# Patient Record
Sex: MALE | Race: WHITE | NOT HISPANIC OR LATINO | URBAN - METROPOLITAN AREA
[De-identification: names, ages, dates, MRNs, and addresses within clinical notes are randomized per-mention and may not be internally consistent; named-entity substitution may affect disease eponyms.]

---

## 2018-05-08 ENCOUNTER — EMERGENCY (EMERGENCY)
Facility: HOSPITAL | Age: 73
LOS: 0 days | Discharge: NURSING, REHAB OR LONG-TERM CARE | End: 2018-05-08
Attending: EMERGENCY MEDICINE | Admitting: EMERGENCY MEDICINE
Payer: MEDICARE

## 2018-05-08 VITALS
HEART RATE: 72 BPM | RESPIRATION RATE: 14 BRPM | DIASTOLIC BLOOD PRESSURE: 64 MMHG | SYSTOLIC BLOOD PRESSURE: 135 MMHG | OXYGEN SATURATION: 97 % | TEMPERATURE: 99 F

## 2018-05-08 VITALS
HEIGHT: 71 IN | WEIGHT: 197.09 LBS | HEART RATE: 79 BPM | RESPIRATION RATE: 14 BRPM | OXYGEN SATURATION: 97 % | TEMPERATURE: 99 F | SYSTOLIC BLOOD PRESSURE: 85 MMHG | DIASTOLIC BLOOD PRESSURE: 58 MMHG

## 2018-05-08 LAB
ALBUMIN SERPL ELPH-MCNC: 2.7 G/DL — LOW (ref 3.3–5)
ALP SERPL-CCNC: 69 U/L — SIGNIFICANT CHANGE UP (ref 40–120)
ALT FLD-CCNC: 43 U/L — SIGNIFICANT CHANGE UP (ref 12–78)
ANION GAP SERPL CALC-SCNC: 7 MMOL/L — SIGNIFICANT CHANGE UP (ref 5–17)
AST SERPL-CCNC: 35 U/L — SIGNIFICANT CHANGE UP (ref 15–37)
BASOPHILS # BLD AUTO: 0.04 K/UL — SIGNIFICANT CHANGE UP (ref 0–0.2)
BASOPHILS NFR BLD AUTO: 0.3 % — SIGNIFICANT CHANGE UP (ref 0–2)
BILIRUB SERPL-MCNC: 0.2 MG/DL — SIGNIFICANT CHANGE UP (ref 0.2–1.2)
BUN SERPL-MCNC: 21 MG/DL — SIGNIFICANT CHANGE UP (ref 7–23)
CALCIUM SERPL-MCNC: 9.1 MG/DL — SIGNIFICANT CHANGE UP (ref 8.5–10.1)
CHLORIDE SERPL-SCNC: 101 MMOL/L — SIGNIFICANT CHANGE UP (ref 96–108)
CO2 SERPL-SCNC: 28 MMOL/L — SIGNIFICANT CHANGE UP (ref 22–31)
CREAT SERPL-MCNC: 0.76 MG/DL — SIGNIFICANT CHANGE UP (ref 0.5–1.3)
EOSINOPHIL # BLD AUTO: 0.12 K/UL — SIGNIFICANT CHANGE UP (ref 0–0.5)
EOSINOPHIL NFR BLD AUTO: 1 % — SIGNIFICANT CHANGE UP (ref 0–6)
GLUCOSE SERPL-MCNC: 99 MG/DL — SIGNIFICANT CHANGE UP (ref 70–99)
HCT VFR BLD CALC: 33.3 % — LOW (ref 39–50)
HGB BLD-MCNC: 10.8 G/DL — LOW (ref 13–17)
IMM GRANULOCYTES NFR BLD AUTO: 0.4 % — SIGNIFICANT CHANGE UP (ref 0–1.5)
LACTATE SERPL-SCNC: 2.1 MMOL/L — HIGH (ref 0.7–2)
LYMPHOCYTES # BLD AUTO: 1.61 K/UL — SIGNIFICANT CHANGE UP (ref 1–3.3)
LYMPHOCYTES # BLD AUTO: 13.8 % — SIGNIFICANT CHANGE UP (ref 13–44)
MCHC RBC-ENTMCNC: 27.3 PG — SIGNIFICANT CHANGE UP (ref 27–34)
MCHC RBC-ENTMCNC: 32.4 GM/DL — SIGNIFICANT CHANGE UP (ref 32–36)
MCV RBC AUTO: 84.1 FL — SIGNIFICANT CHANGE UP (ref 80–100)
MONOCYTES # BLD AUTO: 0.98 K/UL — HIGH (ref 0–0.9)
MONOCYTES NFR BLD AUTO: 8.4 % — SIGNIFICANT CHANGE UP (ref 2–14)
NEUTROPHILS # BLD AUTO: 8.87 K/UL — HIGH (ref 1.8–7.4)
NEUTROPHILS NFR BLD AUTO: 76.1 % — SIGNIFICANT CHANGE UP (ref 43–77)
PLATELET # BLD AUTO: 399 K/UL — SIGNIFICANT CHANGE UP (ref 150–400)
POTASSIUM SERPL-MCNC: 4.3 MMOL/L — SIGNIFICANT CHANGE UP (ref 3.5–5.3)
POTASSIUM SERPL-SCNC: 4.3 MMOL/L — SIGNIFICANT CHANGE UP (ref 3.5–5.3)
PROT SERPL-MCNC: 6.8 GM/DL — SIGNIFICANT CHANGE UP (ref 6–8.3)
RBC # BLD: 3.96 M/UL — LOW (ref 4.2–5.8)
RBC # FLD: 14.6 % — HIGH (ref 10.3–14.5)
SODIUM SERPL-SCNC: 136 MMOL/L — SIGNIFICANT CHANGE UP (ref 135–145)
WBC # BLD: 11.67 K/UL — HIGH (ref 3.8–10.5)
WBC # FLD AUTO: 11.67 K/UL — HIGH (ref 3.8–10.5)

## 2018-05-08 PROCEDURE — 99284 EMERGENCY DEPT VISIT MOD MDM: CPT

## 2018-05-08 PROCEDURE — 73630 X-RAY EXAM OF FOOT: CPT | Mod: 26,LT

## 2018-05-08 PROCEDURE — 93010 ELECTROCARDIOGRAM REPORT: CPT

## 2018-05-08 RX ORDER — SODIUM CHLORIDE 9 MG/ML
2700 INJECTION INTRAMUSCULAR; INTRAVENOUS; SUBCUTANEOUS ONCE
Qty: 0 | Refills: 0 | Status: DISCONTINUED | OUTPATIENT
Start: 2018-05-08 | End: 2018-05-08

## 2018-05-08 RX ORDER — SODIUM CHLORIDE 9 MG/ML
1000 INJECTION INTRAMUSCULAR; INTRAVENOUS; SUBCUTANEOUS ONCE
Qty: 0 | Refills: 0 | Status: COMPLETED | OUTPATIENT
Start: 2018-05-08 | End: 2018-05-08

## 2018-05-08 RX ADMIN — SODIUM CHLORIDE 2000 MILLILITER(S): 9 INJECTION INTRAMUSCULAR; INTRAVENOUS; SUBCUTANEOUS at 16:57

## 2018-05-08 NOTE — ED PROVIDER NOTE - OBJECTIVE STATEMENT
74 y/o M w/ hx CVA, PVD pw L heel ulcer, concern for osteo.  Chronic L heel ulcer concern by PMD for osteo, to f/u w/ Dr. Pugh.  Per transfer note, fever, afebrile in ED, pt denies fever, CP, cough, AP, n/v, d/c.

## 2018-05-08 NOTE — ED PROVIDER NOTE - CHIEF COMPLAINT
The patient is a 73y Male complaining of toe pain. The patient is a 73y Male complaining of L heel wound.

## 2018-05-08 NOTE — ED PROVIDER NOTE - PHYSICAL EXAMINATION
wound to dorsal L foot 10x10 w/o surrounding erythema  5x5cm wound to L heel w/o surrounding erythema wound to dorsal L foot 10x10 w/o surrounding erythema  14n30al wound to L heel w/o surrounding erythema  5x5 cm wound to L lateral foot w/o surrounding erythema

## 2018-05-08 NOTE — ED ADULT TRIAGE NOTE - CHIEF COMPLAINT QUOTE
osteo of the toe, sent to see dr mendoza osteo of the toe, sent to see dr mendoza. code sepsis initiated.

## 2018-05-08 NOTE — ED PROVIDER NOTE - PROGRESS NOTE DETAILS
Scribe Jennifer Lopez: skin ulceration to dorsal L foot 10x10 w/o surrounding erythema  5x5cm skin ulceration to L heel w/o surrounding erythema erin Pugh unware of pt, erin Manuel unaware of why pt transported to ED, attempted to reach [facility] care w/o success.  Dw son states was transported to see vascular for possible infection.  wound appears chronic in nature w/o acute infection, will transport back to NH.

## 2018-05-09 DIAGNOSIS — I10 ESSENTIAL (PRIMARY) HYPERTENSION: ICD-10-CM

## 2018-05-09 DIAGNOSIS — G40.909 EPILEPSY, UNSPECIFIED, NOT INTRACTABLE, WITHOUT STATUS EPILEPTICUS: ICD-10-CM

## 2018-05-09 DIAGNOSIS — L89.629 PRESSURE ULCER OF LEFT HEEL, UNSPECIFIED STAGE: ICD-10-CM

## 2018-05-09 DIAGNOSIS — E03.9 HYPOTHYROIDISM, UNSPECIFIED: ICD-10-CM

## 2018-05-09 DIAGNOSIS — I69.359 HEMIPLEGIA AND HEMIPARESIS FOLLOWING CEREBRAL INFARCTION AFFECTING UNSPECIFIED SIDE: ICD-10-CM

## 2018-05-09 DIAGNOSIS — E78.5 HYPERLIPIDEMIA, UNSPECIFIED: ICD-10-CM

## 2018-05-13 LAB
CULTURE RESULTS: SIGNIFICANT CHANGE UP
CULTURE RESULTS: SIGNIFICANT CHANGE UP
SPECIMEN SOURCE: SIGNIFICANT CHANGE UP
SPECIMEN SOURCE: SIGNIFICANT CHANGE UP

## 2018-05-14 PROBLEM — I10 ESSENTIAL (PRIMARY) HYPERTENSION: Chronic | Status: ACTIVE | Noted: 2018-05-08

## 2018-05-14 PROBLEM — E78.5 HYPERLIPIDEMIA, UNSPECIFIED: Chronic | Status: ACTIVE | Noted: 2018-05-08

## 2018-05-14 PROBLEM — G40.909 EPILEPSY, UNSPECIFIED, NOT INTRACTABLE, WITHOUT STATUS EPILEPTICUS: Chronic | Status: ACTIVE | Noted: 2018-05-08

## 2018-05-14 PROBLEM — E03.9 HYPOTHYROIDISM, UNSPECIFIED: Chronic | Status: ACTIVE | Noted: 2018-05-08

## 2018-05-14 PROBLEM — I63.9 CEREBRAL INFARCTION, UNSPECIFIED: Chronic | Status: ACTIVE | Noted: 2018-05-08

## 2018-05-14 PROBLEM — G81.90 HEMIPLEGIA, UNSPECIFIED AFFECTING UNSPECIFIED SIDE: Chronic | Status: ACTIVE | Noted: 2018-05-08

## 2018-05-24 ENCOUNTER — RESULT REVIEW (OUTPATIENT)
Age: 73
End: 2018-05-24

## 2018-05-24 ENCOUNTER — INPATIENT (INPATIENT)
Facility: HOSPITAL | Age: 73
LOS: 4 days | Discharge: NURSING, REHAB OR LONG-TERM CARE | End: 2018-05-29
Attending: THORACIC SURGERY (CARDIOTHORACIC VASCULAR SURGERY) | Admitting: THORACIC SURGERY (CARDIOTHORACIC VASCULAR SURGERY)
Payer: MEDICARE

## 2018-05-24 VITALS
OXYGEN SATURATION: 98 % | TEMPERATURE: 98 F | HEART RATE: 80 BPM | SYSTOLIC BLOOD PRESSURE: 142 MMHG | RESPIRATION RATE: 16 BRPM | WEIGHT: 188.5 LBS | DIASTOLIC BLOOD PRESSURE: 64 MMHG

## 2018-05-24 DIAGNOSIS — I65.29 OCCLUSION AND STENOSIS OF UNSPECIFIED CAROTID ARTERY: Chronic | ICD-10-CM

## 2018-05-24 LAB
ABO RH CONFIRMATION: SIGNIFICANT CHANGE UP
BLD GP AB SCN SERPL QL: SIGNIFICANT CHANGE UP
TYPE + AB SCN PNL BLD: SIGNIFICANT CHANGE UP

## 2018-05-24 PROCEDURE — 88307 TISSUE EXAM BY PATHOLOGIST: CPT | Mod: 26

## 2018-05-24 RX ORDER — DOCUSATE SODIUM 100 MG
200 CAPSULE ORAL AT BEDTIME
Qty: 0 | Refills: 0 | Status: DISCONTINUED | OUTPATIENT
Start: 2018-05-24 | End: 2018-05-29

## 2018-05-24 RX ORDER — PREGABALIN 225 MG/1
1000 CAPSULE ORAL DAILY
Qty: 0 | Refills: 0 | Status: DISCONTINUED | OUTPATIENT
Start: 2018-05-24 | End: 2018-05-29

## 2018-05-24 RX ORDER — IBUPROFEN 200 MG
400 TABLET ORAL
Qty: 0 | Refills: 0 | Status: DISCONTINUED | OUTPATIENT
Start: 2018-05-24 | End: 2018-05-29

## 2018-05-24 RX ORDER — FERROUS SULFATE 325(65) MG
1 TABLET ORAL
Qty: 0 | Refills: 0 | COMMUNITY

## 2018-05-24 RX ORDER — SERTRALINE 25 MG/1
1 TABLET, FILM COATED ORAL
Qty: 0 | Refills: 0 | COMMUNITY

## 2018-05-24 RX ORDER — ALPRAZOLAM 0.25 MG
0.25 TABLET ORAL AT BEDTIME
Qty: 0 | Refills: 0 | Status: DISCONTINUED | OUTPATIENT
Start: 2018-05-24 | End: 2018-05-29

## 2018-05-24 RX ORDER — DOCUSATE SODIUM 100 MG
2 CAPSULE ORAL
Qty: 0 | Refills: 0 | COMMUNITY

## 2018-05-24 RX ORDER — CEFTRIAXONE 500 MG/1
1 INJECTION, POWDER, FOR SOLUTION INTRAMUSCULAR; INTRAVENOUS
Qty: 0 | Refills: 0 | COMMUNITY

## 2018-05-24 RX ORDER — SERTRALINE 25 MG/1
50 TABLET, FILM COATED ORAL DAILY
Qty: 0 | Refills: 0 | Status: DISCONTINUED | OUTPATIENT
Start: 2018-05-24 | End: 2018-05-29

## 2018-05-24 RX ORDER — HYDROMORPHONE HYDROCHLORIDE 2 MG/ML
0.5 INJECTION INTRAMUSCULAR; INTRAVENOUS; SUBCUTANEOUS
Qty: 0 | Refills: 0 | Status: DISCONTINUED | OUTPATIENT
Start: 2018-05-24 | End: 2018-05-24

## 2018-05-24 RX ORDER — CEFAZOLIN SODIUM 1 G
2000 VIAL (EA) INJECTION EVERY 8 HOURS
Qty: 0 | Refills: 0 | Status: COMPLETED | OUTPATIENT
Start: 2018-05-24 | End: 2018-05-25

## 2018-05-24 RX ORDER — LANOLIN ALCOHOL/MO/W.PET/CERES
1 CREAM (GRAM) TOPICAL
Qty: 0 | Refills: 0 | COMMUNITY

## 2018-05-24 RX ORDER — LEVETIRACETAM 250 MG/1
1 TABLET, FILM COATED ORAL
Qty: 0 | Refills: 0 | COMMUNITY

## 2018-05-24 RX ORDER — CLOPIDOGREL BISULFATE 75 MG/1
1 TABLET, FILM COATED ORAL
Qty: 0 | Refills: 0 | COMMUNITY

## 2018-05-24 RX ORDER — LACTOBACILLUS ACIDOPHILUS 100MM CELL
1 CAPSULE ORAL
Qty: 0 | Refills: 0 | COMMUNITY

## 2018-05-24 RX ORDER — FOLIC ACID 0.8 MG
1 TABLET ORAL DAILY
Qty: 0 | Refills: 0 | Status: DISCONTINUED | OUTPATIENT
Start: 2018-05-24 | End: 2018-05-29

## 2018-05-24 RX ORDER — ASCORBIC ACID 60 MG
1 TABLET,CHEWABLE ORAL
Qty: 0 | Refills: 0 | COMMUNITY

## 2018-05-24 RX ORDER — EZETIMIBE 10 MG/1
1 TABLET ORAL
Qty: 0 | Refills: 0 | COMMUNITY

## 2018-05-24 RX ORDER — METOPROLOL TARTRATE 50 MG
25 TABLET ORAL
Qty: 0 | Refills: 0 | Status: DISCONTINUED | OUTPATIENT
Start: 2018-05-24 | End: 2018-05-29

## 2018-05-24 RX ORDER — ASCORBIC ACID 60 MG
500 TABLET,CHEWABLE ORAL DAILY
Qty: 0 | Refills: 0 | Status: DISCONTINUED | OUTPATIENT
Start: 2018-05-24 | End: 2018-05-29

## 2018-05-24 RX ORDER — LEVETIRACETAM 250 MG/1
500 TABLET, FILM COATED ORAL
Qty: 0 | Refills: 0 | Status: DISCONTINUED | OUTPATIENT
Start: 2018-05-24 | End: 2018-05-29

## 2018-05-24 RX ORDER — CLOPIDOGREL BISULFATE 75 MG/1
75 TABLET, FILM COATED ORAL DAILY
Qty: 0 | Refills: 0 | Status: DISCONTINUED | OUTPATIENT
Start: 2018-05-24 | End: 2018-05-29

## 2018-05-24 RX ORDER — PREGABALIN 225 MG/1
1 CAPSULE ORAL
Qty: 0 | Refills: 0 | COMMUNITY

## 2018-05-24 RX ORDER — SIMVASTATIN 20 MG/1
1 TABLET, FILM COATED ORAL
Qty: 0 | Refills: 0 | COMMUNITY

## 2018-05-24 RX ORDER — CHOLECALCIFEROL (VITAMIN D3) 125 MCG
1 CAPSULE ORAL
Qty: 0 | Refills: 0 | COMMUNITY

## 2018-05-24 RX ORDER — METOPROLOL TARTRATE 50 MG
1 TABLET ORAL
Qty: 0 | Refills: 0 | COMMUNITY

## 2018-05-24 RX ORDER — MORPHINE SULFATE 50 MG/1
5 CAPSULE, EXTENDED RELEASE ORAL
Qty: 0 | Refills: 0 | Status: DISCONTINUED | OUTPATIENT
Start: 2018-05-24 | End: 2018-05-29

## 2018-05-24 RX ORDER — OXYCODONE HYDROCHLORIDE 5 MG/1
5 TABLET ORAL
Qty: 0 | Refills: 0 | Status: DISCONTINUED | OUTPATIENT
Start: 2018-05-24 | End: 2018-05-29

## 2018-05-24 RX ORDER — ENOXAPARIN SODIUM 100 MG/ML
40 INJECTION SUBCUTANEOUS DAILY
Qty: 0 | Refills: 0 | Status: DISCONTINUED | OUTPATIENT
Start: 2018-05-24 | End: 2018-05-29

## 2018-05-24 RX ORDER — LACTOBACILLUS ACIDOPHILUS 100MM CELL
1 CAPSULE ORAL DAILY
Qty: 0 | Refills: 0 | Status: DISCONTINUED | OUTPATIENT
Start: 2018-05-24 | End: 2018-05-29

## 2018-05-24 RX ORDER — DOCUSATE SODIUM 100 MG
200 CAPSULE ORAL AT BEDTIME
Qty: 0 | Refills: 0 | Status: DISCONTINUED | OUTPATIENT
Start: 2018-05-24 | End: 2018-05-24

## 2018-05-24 RX ORDER — SODIUM CHLORIDE 9 MG/ML
1000 INJECTION INTRAMUSCULAR; INTRAVENOUS; SUBCUTANEOUS
Qty: 0 | Refills: 0 | Status: DISCONTINUED | OUTPATIENT
Start: 2018-05-24 | End: 2018-05-25

## 2018-05-24 RX ORDER — IBUPROFEN 200 MG
1 TABLET ORAL
Qty: 0 | Refills: 0 | COMMUNITY

## 2018-05-24 RX ORDER — ALPRAZOLAM 0.25 MG
1 TABLET ORAL
Qty: 0 | Refills: 0 | COMMUNITY

## 2018-05-24 RX ORDER — SIMVASTATIN 20 MG/1
10 TABLET, FILM COATED ORAL AT BEDTIME
Qty: 0 | Refills: 0 | Status: DISCONTINUED | OUTPATIENT
Start: 2018-05-24 | End: 2018-05-29

## 2018-05-24 RX ORDER — OXYCODONE HYDROCHLORIDE 5 MG/1
1 TABLET ORAL
Qty: 0 | Refills: 0 | COMMUNITY

## 2018-05-24 RX ORDER — FOLIC ACID 0.8 MG
1 TABLET ORAL
Qty: 0 | Refills: 0 | COMMUNITY

## 2018-05-24 RX ORDER — SODIUM CHLORIDE 9 MG/ML
1000 INJECTION, SOLUTION INTRAVENOUS
Qty: 0 | Refills: 0 | Status: DISCONTINUED | OUTPATIENT
Start: 2018-05-24 | End: 2018-05-24

## 2018-05-24 RX ORDER — POLYETHYLENE GLYCOL 3350 17 G/17G
17 POWDER, FOR SOLUTION ORAL
Qty: 0 | Refills: 0 | COMMUNITY

## 2018-05-24 RX ORDER — POLYETHYLENE GLYCOL 3350 17 G/17G
17 POWDER, FOR SOLUTION ORAL DAILY
Qty: 0 | Refills: 0 | Status: DISCONTINUED | OUTPATIENT
Start: 2018-05-24 | End: 2018-05-29

## 2018-05-24 RX ORDER — LANOLIN ALCOHOL/MO/W.PET/CERES
3 CREAM (GRAM) TOPICAL AT BEDTIME
Qty: 0 | Refills: 0 | Status: DISCONTINUED | OUTPATIENT
Start: 2018-05-24 | End: 2018-05-29

## 2018-05-24 RX ORDER — ONDANSETRON 8 MG/1
4 TABLET, FILM COATED ORAL ONCE
Qty: 0 | Refills: 0 | Status: DISCONTINUED | OUTPATIENT
Start: 2018-05-24 | End: 2018-05-24

## 2018-05-24 RX ORDER — FERROUS SULFATE 325(65) MG
325 TABLET ORAL DAILY
Qty: 0 | Refills: 0 | Status: DISCONTINUED | OUTPATIENT
Start: 2018-05-24 | End: 2018-05-29

## 2018-05-24 RX ADMIN — Medication 400 MILLIGRAM(S): at 18:09

## 2018-05-24 RX ADMIN — LEVETIRACETAM 500 MILLIGRAM(S): 250 TABLET, FILM COATED ORAL at 18:09

## 2018-05-24 RX ADMIN — OXYCODONE HYDROCHLORIDE 5 MILLIGRAM(S): 5 TABLET ORAL at 22:52

## 2018-05-24 RX ADMIN — POLYETHYLENE GLYCOL 3350 17 GRAM(S): 17 POWDER, FOR SOLUTION ORAL at 13:06

## 2018-05-24 RX ADMIN — Medication 25 MILLIGRAM(S): at 18:09

## 2018-05-24 RX ADMIN — Medication 500 MILLIGRAM(S): at 13:06

## 2018-05-24 RX ADMIN — PREGABALIN 1000 MICROGRAM(S): 225 CAPSULE ORAL at 13:05

## 2018-05-24 RX ADMIN — Medication 1 TABLET(S): at 13:05

## 2018-05-24 RX ADMIN — Medication 1 MILLIGRAM(S): at 13:06

## 2018-05-24 RX ADMIN — CLOPIDOGREL BISULFATE 75 MILLIGRAM(S): 75 TABLET, FILM COATED ORAL at 13:05

## 2018-05-24 RX ADMIN — Medication 100 MILLIGRAM(S): at 17:25

## 2018-05-24 RX ADMIN — SODIUM CHLORIDE 70 MILLILITER(S): 9 INJECTION INTRAMUSCULAR; INTRAVENOUS; SUBCUTANEOUS at 17:24

## 2018-05-24 RX ADMIN — SERTRALINE 50 MILLIGRAM(S): 25 TABLET, FILM COATED ORAL at 18:08

## 2018-05-24 RX ADMIN — Medication 200 MILLIGRAM(S): at 21:50

## 2018-05-24 RX ADMIN — ENOXAPARIN SODIUM 40 MILLIGRAM(S): 100 INJECTION SUBCUTANEOUS at 13:05

## 2018-05-24 RX ADMIN — Medication 3 MILLIGRAM(S): at 21:50

## 2018-05-24 RX ADMIN — Medication 325 MILLIGRAM(S): at 13:05

## 2018-05-24 RX ADMIN — SIMVASTATIN 10 MILLIGRAM(S): 20 TABLET, FILM COATED ORAL at 22:52

## 2018-05-24 RX ADMIN — Medication 0.25 MILLIGRAM(S): at 21:50

## 2018-05-24 NOTE — BRIEF OPERATIVE NOTE - PROCEDURE
<<-----Click on this checkbox to enter Procedure Above knee amputation  05/24/2018  L  Active  QUANGRO

## 2018-05-24 NOTE — CONSULT NOTE ADULT - SUBJECTIVE AND OBJECTIVE BOX
HPI: 74 y/o male with htn, hl, chronic left foot ulcerations, OA, CVA with residual eft sided weakness s/p elective left aka.  Medicine consult requested for post op medical management.  5/24/18: Pt denies any cp, sob, n/v/f/c; no pain at stump      PAST MEDICAL & SURGICAL HISTORY:  Carotid artery plaque  Pressure ulcer: left heel  Hemiparesis: left side  DVT (deep venous thrombosis)  OA (osteoarthritis)  Vitamin D deficiency  Hypothyroid  Epilepsy  HLD (hyperlipidemia)  HTN (hypertension)  Hemiplegia  CVA (cerebral vascular accident)  Carotid artery plaque: &quot;surgery right side      FAMILY HISTORY:   non-contributory to the patient's current presentation        SOCIAL HISTORY:  former smoking hx, no alcohol, no drugs    REVIEW OF SYSTEMS:   All 10 systems reviewed in detailed and found to be negative with the exception of what has already been described above    MEDICATIONS  (STANDING):  ALPRAZolam 0.25 milliGRAM(s) Oral at bedtime  ascorbic acid 500 milliGRAM(s) Oral daily  ceFAZolin   IVPB 2000 milliGRAM(s) IV Intermittent every 8 hours  clopidogrel Tablet 75 milliGRAM(s) Oral daily  cyanocobalamin 1000 MICROGram(s) Oral daily  docusate sodium 200 milliGRAM(s) Oral at bedtime  enoxaparin Injectable 40 milliGRAM(s) SubCutaneous daily  ferrous    sulfate 325 milliGRAM(s) Oral daily  folic acid 1 milliGRAM(s) Oral daily  ibuprofen  Tablet 400 milliGRAM(s) Oral two times a day  lactobacillus acidophilus 1 Tablet(s) Oral daily  levETIRAcetam 500 milliGRAM(s) Oral two times a day  melatonin 3 milliGRAM(s) Oral at bedtime  metoprolol tartrate 25 milliGRAM(s) Oral two times a day  multivitamin 1 Tablet(s) Oral daily  polyethylene glycol 3350 17 Gram(s) Oral daily  sertraline 50 milliGRAM(s) Oral daily  simvastatin 10 milliGRAM(s) Oral at bedtime  sodium chloride 0.9%. 1000 milliLiter(s) (70 mL/Hr) IV Continuous <Continuous>    MEDICATIONS  (PRN):  morphine  - Injectable 5 milliGRAM(s) SubCutaneous every 3 hours PRN Severe Pain  oxyCODONE    IR 5 milliGRAM(s) Oral three times a day with meals PRN Moderate Pain (4 - 6)      Allergies    No Known Allergies    Intolerances          PHYSICAL EXAM:    Vital Signs Last 24 Hrs  T(C): 36.7 (24 May 2018 11:33), Max: 37.2 (24 May 2018 09:25)  T(F): 98 (24 May 2018 11:33), Max: 98.9 (24 May 2018 09:25)  HR: 87 (24 May 2018 11:33) (80 - 89)  BP: 118/623 (24 May 2018 11:33) (111/67 - 143/62)  BP(mean): --  RR: 17 (24 May 2018 11:33) (14 - 17)  SpO2: 100% (24 May 2018 11:33) (95% - 100%)    GEN: A and O, NAD,  mood stable  HEENT:   NC/AT, EOMI,     NECK:   supple    CV:  +S1, +S2, regular, no murmurs or rubs    RESP:   lungs clear to auscultation bilaterally, no wheezing, rales, rhonchi, good air entry bilaterally, DECREASED BS ALEE    GI:  abdomen soft, non-tender, non-distended, normal BS,  no abdominal masses, no palpable masses    RECTAL:  not examined    :  POS SALDAÑA    MSK:   normal muscle tone, no atrophy, no rigidity, no contractions    EXT:   LEFT STUMP DRESSING C/D/I    VASCULAR:  pulses equal and symmetric in the upper and lower extremities    NEURO:  AAOX3, no focal neurological deficits, follows all commands, able to move extremities spontaneously    SKIN:  no ulcers, lesions or rashes    LABS/IMAGING:    PRE OP LABS REVIEWED    EKG: NSR@64BPM

## 2018-05-24 NOTE — CONSULT NOTE ADULT - ASSESSMENT
74 Y/O MALE WITH THE ABOVE MED HX S/P LEFT AKA.    *POSTPROCEDURAL STATE -   POD# 0  MONITOR DRESSING  PAIN CONTROL  STUMP CHECK BY TERESA IN A FEW DAYS  SHAYNE SALDAÑA IN AM  *HTN - BP STABLE  CONT METOPROLOL  *HX OF CVA WITH LEFT RESIDUAL WEAKNESS - CONT PLAVIX AND STATIN  *HL - CONT STATIN  *LEUKOCYTOSIS - NOTED ON PRE OP LABS ? ETIOLOGY, ULCERS?  CONT TO MONITOR  MONITOR FOR FEVERS  *ANEMIA - ON PRE OP LABS -- LIKELY CHRONIC DISEASE  CHECK H/H IN AM  *DVT PROPHY - ON LOVENOX

## 2018-05-24 NOTE — PATIENT PROFILE ADULT. - PMH
CVA (cerebral vascular accident)    DVT (deep venous thrombosis)    Epilepsy    Hemiparesis  left side  Hemiplegia    HLD (hyperlipidemia)    HTN (hypertension)    Hypothyroid    OA (osteoarthritis)    Vitamin D deficiency Carotid artery plaque    CVA (cerebral vascular accident)    DVT (deep venous thrombosis)    Epilepsy    Hemiparesis  left side  Hemiplegia    HLD (hyperlipidemia)    HTN (hypertension)    Hypothyroid    OA (osteoarthritis)    Pressure ulcer  left heel  Vitamin D deficiency

## 2018-05-24 NOTE — PATIENT PROFILE ADULT. - FALL HARM RISK
bones(Osteoporosis,prev fx,steroid use,metastatic bone ca/coagulation(Bleeding disorder R/T clinical cond/anti-coags)/other

## 2018-05-25 LAB
ANION GAP SERPL CALC-SCNC: 5 MMOL/L — SIGNIFICANT CHANGE UP (ref 5–17)
BASOPHILS # BLD AUTO: 0.04 K/UL — SIGNIFICANT CHANGE UP (ref 0–0.2)
BASOPHILS NFR BLD AUTO: 0.4 % — SIGNIFICANT CHANGE UP (ref 0–2)
BUN SERPL-MCNC: 10 MG/DL — SIGNIFICANT CHANGE UP (ref 7–23)
CALCIUM SERPL-MCNC: 8.3 MG/DL — LOW (ref 8.5–10.1)
CHLORIDE SERPL-SCNC: 104 MMOL/L — SIGNIFICANT CHANGE UP (ref 96–108)
CO2 SERPL-SCNC: 25 MMOL/L — SIGNIFICANT CHANGE UP (ref 22–31)
CREAT SERPL-MCNC: 0.62 MG/DL — SIGNIFICANT CHANGE UP (ref 0.5–1.3)
EOSINOPHIL # BLD AUTO: 0.22 K/UL — SIGNIFICANT CHANGE UP (ref 0–0.5)
EOSINOPHIL NFR BLD AUTO: 2.2 % — SIGNIFICANT CHANGE UP (ref 0–6)
GLUCOSE SERPL-MCNC: 102 MG/DL — HIGH (ref 70–99)
HCT VFR BLD CALC: 26.9 % — LOW (ref 39–50)
HGB BLD-MCNC: 8.8 G/DL — LOW (ref 13–17)
IMM GRANULOCYTES NFR BLD AUTO: 0.5 % — SIGNIFICANT CHANGE UP (ref 0–1.5)
LYMPHOCYTES # BLD AUTO: 1.12 K/UL — SIGNIFICANT CHANGE UP (ref 1–3.3)
LYMPHOCYTES # BLD AUTO: 11 % — LOW (ref 13–44)
MCHC RBC-ENTMCNC: 26.2 PG — LOW (ref 27–34)
MCHC RBC-ENTMCNC: 32.7 GM/DL — SIGNIFICANT CHANGE UP (ref 32–36)
MCV RBC AUTO: 80.1 FL — SIGNIFICANT CHANGE UP (ref 80–100)
MONOCYTES # BLD AUTO: 0.73 K/UL — SIGNIFICANT CHANGE UP (ref 0–0.9)
MONOCYTES NFR BLD AUTO: 7.2 % — SIGNIFICANT CHANGE UP (ref 2–14)
NEUTROPHILS # BLD AUTO: 7.98 K/UL — HIGH (ref 1.8–7.4)
NEUTROPHILS NFR BLD AUTO: 78.7 % — HIGH (ref 43–77)
NRBC # BLD: 0 /100 WBCS — SIGNIFICANT CHANGE UP (ref 0–0)
PLATELET # BLD AUTO: 395 K/UL — SIGNIFICANT CHANGE UP (ref 150–400)
POTASSIUM SERPL-MCNC: 3.5 MMOL/L — SIGNIFICANT CHANGE UP (ref 3.5–5.3)
POTASSIUM SERPL-SCNC: 3.5 MMOL/L — SIGNIFICANT CHANGE UP (ref 3.5–5.3)
RBC # BLD: 3.36 M/UL — LOW (ref 4.2–5.8)
RBC # FLD: 15.4 % — HIGH (ref 10.3–14.5)
SODIUM SERPL-SCNC: 134 MMOL/L — LOW (ref 135–145)
WBC # BLD: 10.14 K/UL — SIGNIFICANT CHANGE UP (ref 3.8–10.5)
WBC # FLD AUTO: 10.14 K/UL — SIGNIFICANT CHANGE UP (ref 3.8–10.5)

## 2018-05-25 RX ADMIN — OXYCODONE HYDROCHLORIDE 5 MILLIGRAM(S): 5 TABLET ORAL at 21:43

## 2018-05-25 RX ADMIN — Medication 400 MILLIGRAM(S): at 05:50

## 2018-05-25 RX ADMIN — Medication 500 MILLIGRAM(S): at 12:32

## 2018-05-25 RX ADMIN — POLYETHYLENE GLYCOL 3350 17 GRAM(S): 17 POWDER, FOR SOLUTION ORAL at 12:32

## 2018-05-25 RX ADMIN — Medication 400 MILLIGRAM(S): at 18:00

## 2018-05-25 RX ADMIN — Medication 25 MILLIGRAM(S): at 05:50

## 2018-05-25 RX ADMIN — ENOXAPARIN SODIUM 40 MILLIGRAM(S): 100 INJECTION SUBCUTANEOUS at 12:32

## 2018-05-25 RX ADMIN — SERTRALINE 50 MILLIGRAM(S): 25 TABLET, FILM COATED ORAL at 12:31

## 2018-05-25 RX ADMIN — Medication 400 MILLIGRAM(S): at 18:53

## 2018-05-25 RX ADMIN — Medication 1 TABLET(S): at 12:31

## 2018-05-25 RX ADMIN — LEVETIRACETAM 500 MILLIGRAM(S): 250 TABLET, FILM COATED ORAL at 05:50

## 2018-05-25 RX ADMIN — CLOPIDOGREL BISULFATE 75 MILLIGRAM(S): 75 TABLET, FILM COATED ORAL at 12:31

## 2018-05-25 RX ADMIN — Medication 400 MILLIGRAM(S): at 06:08

## 2018-05-25 RX ADMIN — SIMVASTATIN 10 MILLIGRAM(S): 20 TABLET, FILM COATED ORAL at 21:35

## 2018-05-25 RX ADMIN — PREGABALIN 1000 MICROGRAM(S): 225 CAPSULE ORAL at 12:31

## 2018-05-25 RX ADMIN — Medication 0.25 MILLIGRAM(S): at 21:35

## 2018-05-25 RX ADMIN — Medication 1 MILLIGRAM(S): at 12:32

## 2018-05-25 RX ADMIN — LEVETIRACETAM 500 MILLIGRAM(S): 250 TABLET, FILM COATED ORAL at 18:00

## 2018-05-25 RX ADMIN — Medication 100 MILLIGRAM(S): at 00:50

## 2018-05-25 RX ADMIN — Medication 3 MILLIGRAM(S): at 21:35

## 2018-05-25 RX ADMIN — Medication 200 MILLIGRAM(S): at 21:35

## 2018-05-25 RX ADMIN — Medication 325 MILLIGRAM(S): at 12:31

## 2018-05-25 RX ADMIN — OXYCODONE HYDROCHLORIDE 5 MILLIGRAM(S): 5 TABLET ORAL at 06:09

## 2018-05-25 RX ADMIN — Medication 25 MILLIGRAM(S): at 17:59

## 2018-05-25 NOTE — PHYSICAL THERAPY INITIAL EVALUATION ADULT - PERTINENT HX OF CURRENT PROBLEM, REHAB EVAL
73M with chronic left foot ulcerations, CVA with residual left sided weakness s/p elective left aka.

## 2018-05-25 NOTE — PHYSICAL THERAPY INITIAL EVALUATION ADULT - GENERAL OBSERVATIONS, REHAB EVAL
pt received supine in bed on 5S. pt pleasant and cooperative. L AKA ace wrapped.  LLE resting in L hip abd with ER, slight hip flexion.

## 2018-05-26 RX ADMIN — Medication 25 MILLIGRAM(S): at 18:37

## 2018-05-26 RX ADMIN — POLYETHYLENE GLYCOL 3350 17 GRAM(S): 17 POWDER, FOR SOLUTION ORAL at 13:06

## 2018-05-26 RX ADMIN — Medication 3 MILLIGRAM(S): at 21:58

## 2018-05-26 RX ADMIN — LEVETIRACETAM 500 MILLIGRAM(S): 250 TABLET, FILM COATED ORAL at 18:38

## 2018-05-26 RX ADMIN — Medication 1 TABLET(S): at 13:06

## 2018-05-26 RX ADMIN — CLOPIDOGREL BISULFATE 75 MILLIGRAM(S): 75 TABLET, FILM COATED ORAL at 13:06

## 2018-05-26 RX ADMIN — LEVETIRACETAM 500 MILLIGRAM(S): 250 TABLET, FILM COATED ORAL at 05:19

## 2018-05-26 RX ADMIN — Medication 200 MILLIGRAM(S): at 21:58

## 2018-05-26 RX ADMIN — Medication 400 MILLIGRAM(S): at 05:19

## 2018-05-26 RX ADMIN — Medication 500 MILLIGRAM(S): at 13:34

## 2018-05-26 RX ADMIN — Medication 0.25 MILLIGRAM(S): at 21:59

## 2018-05-26 RX ADMIN — SIMVASTATIN 10 MILLIGRAM(S): 20 TABLET, FILM COATED ORAL at 21:58

## 2018-05-26 RX ADMIN — Medication 25 MILLIGRAM(S): at 05:19

## 2018-05-26 RX ADMIN — ENOXAPARIN SODIUM 40 MILLIGRAM(S): 100 INJECTION SUBCUTANEOUS at 13:06

## 2018-05-26 RX ADMIN — Medication 1 TABLET(S): at 13:34

## 2018-05-26 RX ADMIN — Medication 1 MILLIGRAM(S): at 13:34

## 2018-05-26 RX ADMIN — Medication 400 MILLIGRAM(S): at 18:38

## 2018-05-26 RX ADMIN — Medication 325 MILLIGRAM(S): at 13:06

## 2018-05-26 RX ADMIN — SERTRALINE 50 MILLIGRAM(S): 25 TABLET, FILM COATED ORAL at 13:09

## 2018-05-26 RX ADMIN — PREGABALIN 1000 MICROGRAM(S): 225 CAPSULE ORAL at 13:06

## 2018-05-27 LAB
ANION GAP SERPL CALC-SCNC: 7 MMOL/L — SIGNIFICANT CHANGE UP (ref 5–17)
BUN SERPL-MCNC: 9 MG/DL — SIGNIFICANT CHANGE UP (ref 7–23)
CALCIUM SERPL-MCNC: 8.6 MG/DL — SIGNIFICANT CHANGE UP (ref 8.5–10.1)
CHLORIDE SERPL-SCNC: 105 MMOL/L — SIGNIFICANT CHANGE UP (ref 96–108)
CO2 SERPL-SCNC: 26 MMOL/L — SIGNIFICANT CHANGE UP (ref 22–31)
CREAT SERPL-MCNC: 0.55 MG/DL — SIGNIFICANT CHANGE UP (ref 0.5–1.3)
GLUCOSE SERPL-MCNC: 119 MG/DL — HIGH (ref 70–99)
HCT VFR BLD CALC: 28.2 % — LOW (ref 39–50)
HGB BLD-MCNC: 9.1 G/DL — LOW (ref 13–17)
MCHC RBC-ENTMCNC: 25.5 PG — LOW (ref 27–34)
MCHC RBC-ENTMCNC: 32.3 GM/DL — SIGNIFICANT CHANGE UP (ref 32–36)
MCV RBC AUTO: 79 FL — LOW (ref 80–100)
NRBC # BLD: 0 /100 WBCS — SIGNIFICANT CHANGE UP (ref 0–0)
PLATELET # BLD AUTO: 413 K/UL — HIGH (ref 150–400)
POTASSIUM SERPL-MCNC: 3.4 MMOL/L — LOW (ref 3.5–5.3)
POTASSIUM SERPL-SCNC: 3.4 MMOL/L — LOW (ref 3.5–5.3)
RBC # BLD: 3.57 M/UL — LOW (ref 4.2–5.8)
RBC # FLD: 15.2 % — HIGH (ref 10.3–14.5)
SODIUM SERPL-SCNC: 138 MMOL/L — SIGNIFICANT CHANGE UP (ref 135–145)
WBC # BLD: 7.33 K/UL — SIGNIFICANT CHANGE UP (ref 3.8–10.5)
WBC # FLD AUTO: 7.33 K/UL — SIGNIFICANT CHANGE UP (ref 3.8–10.5)

## 2018-05-27 RX ORDER — POTASSIUM CHLORIDE 20 MEQ
20 PACKET (EA) ORAL
Qty: 0 | Refills: 0 | Status: COMPLETED | OUTPATIENT
Start: 2018-05-27 | End: 2018-05-27

## 2018-05-27 RX ADMIN — Medication 1 TABLET(S): at 12:12

## 2018-05-27 RX ADMIN — Medication 400 MILLIGRAM(S): at 06:21

## 2018-05-27 RX ADMIN — Medication 25 MILLIGRAM(S): at 18:29

## 2018-05-27 RX ADMIN — ENOXAPARIN SODIUM 40 MILLIGRAM(S): 100 INJECTION SUBCUTANEOUS at 12:11

## 2018-05-27 RX ADMIN — SIMVASTATIN 10 MILLIGRAM(S): 20 TABLET, FILM COATED ORAL at 21:52

## 2018-05-27 RX ADMIN — CLOPIDOGREL BISULFATE 75 MILLIGRAM(S): 75 TABLET, FILM COATED ORAL at 12:24

## 2018-05-27 RX ADMIN — Medication 400 MILLIGRAM(S): at 18:29

## 2018-05-27 RX ADMIN — Medication 3 MILLIGRAM(S): at 21:53

## 2018-05-27 RX ADMIN — Medication 500 MILLIGRAM(S): at 12:23

## 2018-05-27 RX ADMIN — LEVETIRACETAM 500 MILLIGRAM(S): 250 TABLET, FILM COATED ORAL at 06:21

## 2018-05-27 RX ADMIN — Medication 325 MILLIGRAM(S): at 12:12

## 2018-05-27 RX ADMIN — LEVETIRACETAM 500 MILLIGRAM(S): 250 TABLET, FILM COATED ORAL at 18:29

## 2018-05-27 RX ADMIN — Medication 20 MILLIEQUIVALENT(S): at 12:12

## 2018-05-27 RX ADMIN — Medication 1 MILLIGRAM(S): at 12:12

## 2018-05-27 RX ADMIN — Medication 0.25 MILLIGRAM(S): at 21:52

## 2018-05-27 RX ADMIN — PREGABALIN 1000 MICROGRAM(S): 225 CAPSULE ORAL at 12:23

## 2018-05-27 RX ADMIN — POLYETHYLENE GLYCOL 3350 17 GRAM(S): 17 POWDER, FOR SOLUTION ORAL at 12:12

## 2018-05-27 RX ADMIN — Medication 20 MILLIEQUIVALENT(S): at 16:08

## 2018-05-27 RX ADMIN — Medication 25 MILLIGRAM(S): at 06:21

## 2018-05-27 RX ADMIN — SERTRALINE 50 MILLIGRAM(S): 25 TABLET, FILM COATED ORAL at 12:13

## 2018-05-28 RX ADMIN — Medication 400 MILLIGRAM(S): at 17:39

## 2018-05-28 RX ADMIN — Medication 400 MILLIGRAM(S): at 06:03

## 2018-05-28 RX ADMIN — LEVETIRACETAM 500 MILLIGRAM(S): 250 TABLET, FILM COATED ORAL at 17:39

## 2018-05-28 RX ADMIN — Medication 3 MILLIGRAM(S): at 21:00

## 2018-05-28 RX ADMIN — ENOXAPARIN SODIUM 40 MILLIGRAM(S): 100 INJECTION SUBCUTANEOUS at 11:54

## 2018-05-28 RX ADMIN — Medication 1 TABLET(S): at 11:54

## 2018-05-28 RX ADMIN — Medication 1 MILLIGRAM(S): at 11:55

## 2018-05-28 RX ADMIN — SIMVASTATIN 10 MILLIGRAM(S): 20 TABLET, FILM COATED ORAL at 21:00

## 2018-05-28 RX ADMIN — Medication 325 MILLIGRAM(S): at 11:55

## 2018-05-28 RX ADMIN — CLOPIDOGREL BISULFATE 75 MILLIGRAM(S): 75 TABLET, FILM COATED ORAL at 11:54

## 2018-05-28 RX ADMIN — Medication 0.25 MILLIGRAM(S): at 21:00

## 2018-05-28 RX ADMIN — Medication 500 MILLIGRAM(S): at 11:54

## 2018-05-28 RX ADMIN — SERTRALINE 50 MILLIGRAM(S): 25 TABLET, FILM COATED ORAL at 11:54

## 2018-05-28 RX ADMIN — Medication 25 MILLIGRAM(S): at 17:39

## 2018-05-28 RX ADMIN — Medication 25 MILLIGRAM(S): at 06:03

## 2018-05-28 RX ADMIN — PREGABALIN 1000 MICROGRAM(S): 225 CAPSULE ORAL at 11:54

## 2018-05-28 RX ADMIN — LEVETIRACETAM 500 MILLIGRAM(S): 250 TABLET, FILM COATED ORAL at 06:03

## 2018-05-29 VITALS
RESPIRATION RATE: 17 BRPM | SYSTOLIC BLOOD PRESSURE: 129 MMHG | TEMPERATURE: 97 F | HEART RATE: 67 BPM | OXYGEN SATURATION: 99 % | DIASTOLIC BLOOD PRESSURE: 55 MMHG

## 2018-05-29 RX ADMIN — Medication 500 MILLIGRAM(S): at 11:59

## 2018-05-29 RX ADMIN — Medication 1 TABLET(S): at 11:59

## 2018-05-29 RX ADMIN — PREGABALIN 1000 MICROGRAM(S): 225 CAPSULE ORAL at 11:59

## 2018-05-29 RX ADMIN — LEVETIRACETAM 500 MILLIGRAM(S): 250 TABLET, FILM COATED ORAL at 05:11

## 2018-05-29 RX ADMIN — Medication 1 MILLIGRAM(S): at 11:59

## 2018-05-29 RX ADMIN — Medication 400 MILLIGRAM(S): at 05:11

## 2018-05-29 RX ADMIN — SERTRALINE 50 MILLIGRAM(S): 25 TABLET, FILM COATED ORAL at 11:59

## 2018-05-29 RX ADMIN — ENOXAPARIN SODIUM 40 MILLIGRAM(S): 100 INJECTION SUBCUTANEOUS at 11:59

## 2018-05-29 RX ADMIN — Medication 325 MILLIGRAM(S): at 11:59

## 2018-05-29 RX ADMIN — Medication 25 MILLIGRAM(S): at 05:11

## 2018-05-29 RX ADMIN — CLOPIDOGREL BISULFATE 75 MILLIGRAM(S): 75 TABLET, FILM COATED ORAL at 11:59

## 2018-05-29 NOTE — DISCHARGE NOTE ADULT - PATIENT PORTAL LINK FT
You can access the [facility] Patient Portal, offered by [facility], by registering with the following website: [email or web address]

## 2018-05-29 NOTE — DISCHARGE NOTE ADULT - HOSPITAL COURSE
Pt underwent an AKA (L) Thursday May 24 and had an uncomplicated post operative course.  His stump was evaluated May 29 and was healing well.

## 2018-05-29 NOTE — DISCHARGE NOTE ADULT - CARE PLAN
Principal Discharge DX:	Gangrene  Goal:	prevention of sepsis  Assessment and plan of treatment:	fit for prosthesis after stump heals

## 2018-05-29 NOTE — PROGRESS NOTE ADULT - ASSESSMENT
72 Y/O MALE WITH THE ABOVE MED HX S/P LEFT AKA.    *POSTPROCEDURAL STATE -   POD# 3  MONITOR DRESSING  PAIN CONTROL  STUMP CHECK BY TERESA IN A FEW DAYS      *HTN - BP STABLE  CONT METOPROLOL    *HX OF CVA WITH LEFT RESIDUAL WEAKNESS - CONT PLAVIX AND STATIN  *HL - CONT STATIN  *LEUKOCYTOSIS - FROM ULCERS NOW RESOLVED  *ANEMIA - ACUTE ON CHRONIC DISEASE ; FROM ACUTE BLOOD LOSS,  h/h stable    *DVT PROPHY - ON LOVENOX
74 Y/O MALE WITH THE ABOVE MED HX S/P LEFT AKA.    *POSTPROCEDURAL STATE -   POD# 2  MONITOR DRESSING  PAIN CONTROL  STUMP CHECK BY TERESA IN A FEW DAYS  SHAYNE SALDAÑA TODAY  DC IVF    *HTN - BP STABLE  CONT METOPROLOL    *HX OF CVA WITH LEFT RESIDUAL WEAKNESS - CONT PLAVIX AND STATIN  *HL - CONT STATIN  *LEUKOCYTOSIS - FROM ULCERS NOW RESOLVED  *ANEMIA - ACUTE ON CHRONIC DISEASE ; FROM ACUTE BLOOD LOSS    *DVT PROPHY - ON LOVENOX
72 Y/O MALE WITH THE ABOVE MED HX S/P LEFT AKA.    *POSTPROCEDURAL STATE -   POD# 4  MONITOR DRESSING  PAIN CONTROL  STUMP CHECK BY TERESA LIKELY TOMORROW      *HTN - BP STABLE  CONT METOPROLOL    *HX OF CVA WITH LEFT RESIDUAL WEAKNESS - CONT PLAVIX AND STATIN  *HL - CONT STATIN  *LEUKOCYTOSIS - FROM ULCERS NOW RESOLVED  *ANEMIA - ACUTE ON CHRONIC DISEASE ; FROM ACUTE BLOOD LOSS,  h/h stable    *DVT PROPHY - ON LOVENOX
74 Y/O MALE WITH THE ABOVE MED HX S/P LEFT AKA.    *POSTPROCEDURAL STATE -   POD# 1  MONITOR DRESSING  PAIN CONTROL  STUMP CHECK BY TERESA IN A FEW DAYS  DC SALDAÑA TOMORROW  DECREASE OR DC IVF  TOMORROW    *HTN - BP STABLE  CONT METOPROLOL    *HX OF CVA WITH LEFT RESIDUAL WEAKNESS - CONT PLAVIX AND STATIN  *HL - CONT STATIN  *LEUKOCYTOSIS - FROM ULCERS NOW RESOLVED  *ANEMIA - ACUTE ON CHRONIC DISEASE ; FROM ACUTE BLOOD LOSS    *DVT PROPHY - ON LOVENOX
74 Y/O MALE WITH THE ABOVE MED HX S/P LEFT AKA.    *POSTPROCEDURAL STATE -   POD# 5  MONITOR DRESSING  PAIN CONTROL  STUMP CHECK BY TERESA LIKELY TODAY      *HTN - BP STABLE  CONT METOPROLOL    *HX OF CVA WITH LEFT RESIDUAL WEAKNESS - CONT PLAVIX AND STATIN  *HL - CONT STATIN  *LEUKOCYTOSIS - FROM ULCERS NOW RESOLVED  *ANEMIA - ACUTE ON CHRONIC DISEASE ; FROM ACUTE BLOOD LOSS,  h/h stable    *DVT PROPHY - ON LOVENOX

## 2018-05-29 NOTE — DISCHARGE NOTE ADULT - CARE PROVIDER_API CALL
Magdy Pugh), Vascular Surgery  [address] [place] [address]  Phone: [phone]  Fax: [phone]

## 2018-05-29 NOTE — PROGRESS NOTE ADULT - SUBJECTIVE AND OBJECTIVE BOX
HPI: 72 y/o male with htn, hl, chronic left foot ulcerations, OA, CVA with residual eft sided weakness s/p elective left aka.  Medicine consult requested for post op medical management.  18: Pt denies any cp, sob, n/v/f/c; no pain at stump  18: Pt feels good, denies any pain, no cp, sob, n/v/f/c; stump pain controlled  18: Pt denies n/v/f/c; no pain; dillon po well    REVIEW OF SYSTEMS:   All 10 systems reviewed in detailed and found to be negative with the exception of what has already been described above    PHYSICAL EXAM:    Vital Signs Last 24 Hrs  T(C): 37.1 (26 May 2018 05:20), Max: 37.1 (26 May 2018 05:20)  T(F): 98.8 (26 May 2018 05:20), Max: 98.8 (26 May 2018 05:20)  HR: 82 (26 May 2018 05:20) (79 - 92)  BP: 114/50 (26 May 2018 05:20) (114/50 - 128/51)  BP(mean): --  RR: 16 (26 May 2018 05:20) (16 - 18)  SpO2: 96% (26 May 2018 05:20) (95% - 96%)    GEN: A and O, NAD,  mood stable  HEENT:   NC/AT, EOMI,     NECK:   supple    CV:  +S1, +S2, regular, no murmurs or rubs    RESP:   lungs clear to auscultation bilaterally, no wheezing, rales, rhonchi, good air entry bilaterally, DECREASED BS ALEE    GI:  abdomen soft, non-tender, non-distended, normal BS,  no abdominal masses, no palpable masses    RECTAL:  not examined    :  [place]    MSK:   normal muscle tone, no atrophy, no rigidity, no contractions    EXT:   LEFT STUMP DRESSING C/D/I    VASCULAR:  pulses equal and symmetric in the upper and lower extremities    NEURO:  AAOX3, no focal neurological deficits, follows all commands, able to move extremities spontaneously    SKIN:  no ulcers, lesions or rashes    LABS/IMAGIN.8    10.14 )-----------( 395      ( 25 May 2018 06:57 )             26.9     05-    134<L>  |  104  |  10  ----------------------------<  102<H>  3.5   |  25  |  0.62    Ca    8.3<L>      25 May 2018 06:57        MEDICATIONS  (STANDING):  ALPRAZolam 0.25 milliGRAM(s) Oral at bedtime  ascorbic acid 500 milliGRAM(s) Oral daily  clopidogrel Tablet 75 milliGRAM(s) Oral daily  cyanocobalamin 1000 MICROGram(s) Oral daily  docusate sodium 200 milliGRAM(s) Oral at bedtime  enoxaparin Injectable 40 milliGRAM(s) SubCutaneous daily  ferrous    sulfate 325 milliGRAM(s) Oral daily  folic acid 1 milliGRAM(s) Oral daily  ibuprofen  Tablet 400 milliGRAM(s) Oral two times a day  lactobacillus acidophilus 1 Tablet(s) Oral daily  levETIRAcetam 500 milliGRAM(s) Oral two times a day  melatonin 3 milliGRAM(s) Oral at bedtime  metoprolol tartrate 25 milliGRAM(s) Oral two times a day  multivitamin 1 Tablet(s) Oral daily  polyethylene glycol 3350 17 Gram(s) Oral daily  sertraline 50 milliGRAM(s) Oral daily  simvastatin 10 milliGRAM(s) Oral at bedtime    MEDICATIONS  (PRN):  morphine  - Injectable 5 milliGRAM(s) SubCutaneous every 3 hours PRN Severe Pain  oxyCODONE    IR 5 milliGRAM(s) Oral three times a day with meals PRN Moderate Pain (4 - 6)
HPI: 72 y/o male with htn, hl, chronic left foot ulcerations, OA, CVA with residual left sided weakness s/p elective left aka.  Medicine consult requested for post op medical management.  18: Pt denies any cp, sob, n/v/f/c; no pain at stump  18: Pt feels good, denies any pain, no cp, sob, n/v/f/c; stump pain controlled  18: Pt denies n/v/f/c; no pain; dillon po well  18: No cp, sob, n/v/f/c; easting well; voiding fine after parra removal; left stump - pain controlled    REVIEW OF SYSTEMS:   All 10 systems reviewed in detailed and found to be negative with the exception of what has already been described above    PHYSICAL EXAM:    Vital Signs Last 24 Hrs  T(C): 36.4 (27 May 2018 05:54), Max: 37.1 (26 May 2018 21:58)  T(F): 97.6 (27 May 2018 05:54), Max: 98.7 (26 May 2018 21:58)  HR: 79 (27 May 2018 05:54) (74 - 79)  BP: 100/74 (27 May 2018 05:54) (100/74 - 137/50)  BP(mean): --  RR: 18 (27 May 2018 05:54) (18 - 18)  SpO2: 92% (27 May 2018 05:54) (92% - 97%)    GEN: A and O, NAD,  mood stable  HEENT:   NC/AT, EOMI,     NECK:   supple    CV:  +S1, +S2, regular, no murmurs or rubs    RESP:   lungs clear to auscultation bilaterally, no wheezing, rales, rhonchi, good air entry bilaterally, DECREASED BS ALEE    GI:  abdomen soft, non-tender, non-distended, normal BS,  no abdominal masses, no palpable masses    RECTAL:  not examined    :  PARRA OUT    MSK:   normal muscle tone, no atrophy, no rigidity, no contractions    EXT:   LEFT STUMP DRESSING C/D/I    VASCULAR:  pulses equal and symmetric in the upper and lower extremities    NEURO:  AAOX3, no focal neurological deficits, follows all commands, able to move extremities spontaneously    SKIN:  no ulcers, lesions or rashes    LABS/IMAGIN.1    7.33  )-----------( 413      ( 27 May 2018 07:23 )             28.2     05-    138  |  105  |  9   ----------------------------<  119<H>  3.4<L>   |  26  |  0.55    Ca    8.6      27 May 2018 07:23    MEDICATIONS  (STANDING):  ALPRAZolam 0.25 milliGRAM(s) Oral at bedtime  ascorbic acid 500 milliGRAM(s) Oral daily  clopidogrel Tablet 75 milliGRAM(s) Oral daily  cyanocobalamin 1000 MICROGram(s) Oral daily  docusate sodium 200 milliGRAM(s) Oral at bedtime  enoxaparin Injectable 40 milliGRAM(s) SubCutaneous daily  ferrous    sulfate 325 milliGRAM(s) Oral daily  folic acid 1 milliGRAM(s) Oral daily  ibuprofen  Tablet 400 milliGRAM(s) Oral two times a day  lactobacillus acidophilus 1 Tablet(s) Oral daily  levETIRAcetam 500 milliGRAM(s) Oral two times a day  melatonin 3 milliGRAM(s) Oral at bedtime  metoprolol tartrate 25 milliGRAM(s) Oral two times a day  multivitamin 1 Tablet(s) Oral daily  polyethylene glycol 3350 17 Gram(s) Oral daily  potassium chloride    Tablet ER 20 milliEquivalent(s) Oral every 2 hours  sertraline 50 milliGRAM(s) Oral daily  simvastatin 10 milliGRAM(s) Oral at bedtime    MEDICATIONS  (PRN):  morphine  - Injectable 5 milliGRAM(s) SubCutaneous every 3 hours PRN Severe Pain  oxyCODONE    IR 5 milliGRAM(s) Oral three times a day with meals PRN Moderate Pain (4 - 6)
afebrile  no pain    stump healing well without hematoma or evidence of infection    A/P  OK for discharge    MEDICATIONS  (STANDING):  ALPRAZolam 0.25 milliGRAM(s) Oral at bedtime  ascorbic acid 500 milliGRAM(s) Oral daily  clopidogrel Tablet 75 milliGRAM(s) Oral daily  cyanocobalamin 1000 MICROGram(s) Oral daily  docusate sodium 200 milliGRAM(s) Oral at bedtime  enoxaparin Injectable 40 milliGRAM(s) SubCutaneous daily  ferrous    sulfate 325 milliGRAM(s) Oral daily  folic acid 1 milliGRAM(s) Oral daily  ibuprofen  Tablet 400 milliGRAM(s) Oral two times a day  lactobacillus acidophilus 1 Tablet(s) Oral daily  levETIRAcetam 500 milliGRAM(s) Oral two times a day  melatonin 3 milliGRAM(s) Oral at bedtime  metoprolol tartrate 25 milliGRAM(s) Oral two times a day  multivitamin 1 Tablet(s) Oral daily  polyethylene glycol 3350 17 Gram(s) Oral daily  sertraline 50 milliGRAM(s) Oral daily  simvastatin 10 milliGRAM(s) Oral at bedtime    MEDICATIONS  (PRN):  morphine  - Injectable 5 milliGRAM(s) SubCutaneous every 3 hours PRN Severe Pain  oxyCODONE    IR 5 milliGRAM(s) Oral three times a day with meals PRN Moderate Pain (4 - 6)      Allergies    No Known Allergies    Intolerances        Flatus: [ ] YES [ ] NO             Bowel Movement: [ ] YES [ ] NO  Pain (0-10):            Pain Control Adequate: [ ] YES [ ] NO  Nausea: [ ] YES [ ] NO            Vomiting: [ ] YES [ ] NO  Diarrhea: [ ] YES [ ] NO         Constipation: [ ] YES [ ] NO     Chest Pain: [ ] YES [ ] NO    SOB:  [ ] YES [ ] NO    Vital Signs Last 24 Hrs  T(C): 36.6 (29 May 2018 05:10), Max: 37 (28 May 2018 21:10)  T(F): 97.9 (29 May 2018 05:10), Max: 98.6 (28 May 2018 21:10)  HR: 73 (29 May 2018 05:10) (67 - 90)  BP: 149/57 (29 May 2018 05:10) (122/57 - 149/57)  BP(mean): --  RR: 18 (29 May 2018 05:10) (16 - 18)  SpO2: 96% (29 May 2018 05:10) (96% - 96%)    I&O's Summary      Physical Exam:  General: NAD, resting comfortably  Pulmonary: normal resp effort, CTA-B  Cardiovascular: NSR  Abdominal: soft, NT/ND  Extremities: WWP, normal strength  Neuro: A/O x 3, CNs II-XII grossly intact, normal motor/sensation, no focal deficits  Pulses:   Right:                                                                          Left:  FEM [ ]2+ [ ]1+ [ ]doppler                                             FEM [ ]2+ [ ]1+ [ ]doppler    POP [ ]2+ [ ]1+ [ ]doppler                                             POP [ ]2+ [ ]1+ [ ]doppler    DP [ ]2+ [ ]1+ [ ]doppler                                                DP [ ]2+ [ ]1+ [ ]doppler  PT[ ]2+ [ ]1+ [ ]doppler                                                  PT [ ]2+ [ ]1+ [ ]doppler    LABS:                CAPILLARY BLOOD GLUCOSE          RADIOLOGY & ADDITIONAL TESTS:
HPI: 72 y/o male with htn, hl, chronic left foot ulcerations, OA, CVA with residual eft sided weakness s/p elective left aka.  Medicine consult requested for post op medical management.  18: Pt denies any cp, sob, n/v/f/c; no pain at stump  18: Pt feels good, denies any pain, no cp, sob, n/v/f/c; stump pain controlled      REVIEW OF SYSTEMS:   All 10 systems reviewed in detailed and found to be negative with the exception of what has already been described above    PHYSICAL EXAM:    Vital Signs Last 24 Hrs  T(C): 36.7 (25 May 2018 21:07), Max: 36.9 (25 May 2018 05:20)  T(F): 98.1 (25 May 2018 21:07), Max: 98.4 (25 May 2018 05:20)  HR: 79 (25 May 2018 21:07) (79 - 92)  BP: 119/51 (25 May 2018 21:07) (119/51 - 128/51)  BP(mean): --  RR: 18 (25 May 2018 21:07) (16 - 18)  SpO2: 95% (25 May 2018 21:07) (95% - 97%)    GEN: A and O, NAD,  mood stable  HEENT:   NC/AT, EOMI,     NECK:   supple    CV:  +S1, +S2, regular, no murmurs or rubs    RESP:   lungs clear to auscultation bilaterally, no wheezing, rales, rhonchi, good air entry bilaterally, DECREASED BS ALEE    GI:  abdomen soft, non-tender, non-distended, normal BS,  no abdominal masses, no palpable masses    RECTAL:  not examined    :  POS [place]    MSK:   normal muscle tone, no atrophy, no rigidity, no contractions    EXT:   LEFT STUMP DRESSING C/D/I    VASCULAR:  pulses equal and symmetric in the upper and lower extremities    NEURO:  AAOX3, no focal neurological deficits, follows all commands, able to move extremities spontaneously    SKIN:  no ulcers, lesions or rashes    LABS/IMAGIN.8    10.14 )-----------( 395      ( 25 May 2018 06:57 )             26.9     05-    134<L>  |  104  |  10  ----------------------------<  102<H>  3.5   |  25  |  0.62    Ca    8.3<L>      25 May 2018 06:57        MEDICATIONS  (STANDING):  ALPRAZolam 0.25 milliGRAM(s) Oral at bedtime  ascorbic acid 500 milliGRAM(s) Oral daily  clopidogrel Tablet 75 milliGRAM(s) Oral daily  cyanocobalamin 1000 MICROGram(s) Oral daily  docusate sodium 200 milliGRAM(s) Oral at bedtime  enoxaparin Injectable 40 milliGRAM(s) SubCutaneous daily  ferrous    sulfate 325 milliGRAM(s) Oral daily  folic acid 1 milliGRAM(s) Oral daily  ibuprofen  Tablet 400 milliGRAM(s) Oral two times a day  lactobacillus acidophilus 1 Tablet(s) Oral daily  levETIRAcetam 500 milliGRAM(s) Oral two times a day  melatonin 3 milliGRAM(s) Oral at bedtime  metoprolol tartrate 25 milliGRAM(s) Oral two times a day  multivitamin 1 Tablet(s) Oral daily  polyethylene glycol 3350 17 Gram(s) Oral daily  sertraline 50 milliGRAM(s) Oral daily  simvastatin 10 milliGRAM(s) Oral at bedtime    MEDICATIONS  (PRN):  morphine  - Injectable 5 milliGRAM(s) SubCutaneous every 3 hours PRN Severe Pain  oxyCODONE    IR 5 milliGRAM(s) Oral three times a day with meals PRN Moderate Pain (4 - 6)
HPI: 74 y/o male with htn, hl, chronic left foot ulcerations, OA, CVA with residual left sided weakness s/p elective left aka.  Medicine consult requested for post op medical management.  18: Pt denies any cp, sob, n/v/f/c; no pain at stump  18: Pt feels good, denies any pain, no cp, sob, n/v/f/c; stump pain controlled  18: Pt denies n/v/f/c; no pain; dillon po well  18: No cp, sob, n/v/f/c; easting well; voiding fine after parra removal; left stump - pain controlled  18: No cp, sob, n/v/f/c; denies any pain; eating fine, voiding fine    REVIEW OF SYSTEMS:   All 10 systems reviewed in detailed and found to be negative with the exception of what has already been described above    PHYSICAL EXAM:    Vital Signs Last 24 Hrs  T(C): 36.9 (28 May 2018 05:17), Max: 37.3 (27 May 2018 12:05)  T(F): 98.4 (28 May 2018 05:17), Max: 99.2 (27 May 2018 12:05)  HR: 83 (28 May 2018 05:17) (65 - 83)  BP: 150/60 (28 May 2018 05:17) (116/54 - 150/60)  BP(mean): --  RR: 16 (28 May 2018 05:17) (16 - 18)  SpO2: 97% (28 May 2018 05:17) (96% - 97%)    GEN: A and O, NAD,  mood stable  HEENT:   NC/AT, EOMI,     NECK:   supple    CV:  +S1, +S2, regular, no murmurs or rubs    RESP:   lungs clear to auscultation bilaterally, no wheezing, rales, rhonchi, good air entry bilaterally, DECREASED BS ALEE    GI:  abdomen soft, non-tender, non-distended, normal BS,  no abdominal masses, no palpable masses    RECTAL:  not examined    :  PARRA OUT    MSK:   normal muscle tone, no atrophy, no rigidity, no contractions    EXT:   LEFT STUMP DRESSING C/D/I    VASCULAR:  pulses equal and symmetric in the upper and lower extremities    NEURO:  AAOX3, no focal neurological deficits, follows all commands, able to move extremities spontaneously    SKIN:  no ulcers, lesions or rashes    LABS/IMAGIN.1    7.33  )-----------( 413      ( 27 May 2018 07:23 )             28.2     -    138  |  105  |  9   ----------------------------<  119<H>  3.4<L>   |  26  |  0.55    Ca    8.6      27 May 2018 07:23      MEDICATIONS  (STANDING):  ALPRAZolam 0.25 milliGRAM(s) Oral at bedtime  ascorbic acid 500 milliGRAM(s) Oral daily  clopidogrel Tablet 75 milliGRAM(s) Oral daily  cyanocobalamin 1000 MICROGram(s) Oral daily  docusate sodium 200 milliGRAM(s) Oral at bedtime  enoxaparin Injectable 40 milliGRAM(s) SubCutaneous daily  ferrous    sulfate 325 milliGRAM(s) Oral daily  folic acid 1 milliGRAM(s) Oral daily  ibuprofen  Tablet 400 milliGRAM(s) Oral two times a day  lactobacillus acidophilus 1 Tablet(s) Oral daily  levETIRAcetam 500 milliGRAM(s) Oral two times a day  melatonin 3 milliGRAM(s) Oral at bedtime  metoprolol tartrate 25 milliGRAM(s) Oral two times a day  multivitamin 1 Tablet(s) Oral daily  polyethylene glycol 3350 17 Gram(s) Oral daily  sertraline 50 milliGRAM(s) Oral daily  simvastatin 10 milliGRAM(s) Oral at bedtime    MEDICATIONS  (PRN):  morphine  - Injectable 5 milliGRAM(s) SubCutaneous every 3 hours PRN Severe Pain  oxyCODONE    IR 5 milliGRAM(s) Oral three times a day with meals PRN Moderate Pain (4 - 6)
HPI: 74 y/o male with htn, hl, chronic left foot ulcerations, OA, CVA with residual left sided weakness s/p elective left aka.  Medicine consult requested for post op medical management.  5/24/18: Pt denies any cp, sob, n/v/f/c; no pain at stump  5/25/18: Pt feels good, denies any pain, no cp, sob, n/v/f/c; stump pain controlled  5/26/18: Pt denies n/v/f/c; no pain; dillon po well  5/27/18: No cp, sob, n/v/f/c; easting well; voiding fine after parra removal; left stump - pain controlled  5/28/18: No cp, sob, n/v/f/c; denies any pain; eating fine, voiding fine  5/29/18: No cp, sob, n/v/f/c; no pain to stump    REVIEW OF SYSTEMS:   All 10 systems reviewed in detailed and found to be negative with the exception of what has already been described above    PHYSICAL EXAM:    Vital Signs Last 24 Hrs  T(C): 36.6 (29 May 2018 05:10), Max: 37 (28 May 2018 21:10)  T(F): 97.9 (29 May 2018 05:10), Max: 98.6 (28 May 2018 21:10)  HR: 73 (29 May 2018 05:10) (67 - 90)  BP: 149/57 (29 May 2018 05:10) (122/57 - 149/57)  BP(mean): --  RR: 18 (29 May 2018 05:10) (16 - 18)  SpO2: 96% (29 May 2018 05:10) (96% - 96%)    GEN: A and O, NAD,  mood stable  HEENT:   NC/AT, EOMI,     NECK:   supple    CV:  +S1, +S2, regular, no murmurs or rubs    RESP:   lungs clear to auscultation bilaterally, no wheezing, rales, rhonchi, good air entry bilaterally, DECREASED BS ALEE    GI:  abdomen soft, non-tender, non-distended, normal BS,  no abdominal masses, no palpable masses    RECTAL:  not examined    :  PARRA OUT    MSK:   normal muscle tone, no atrophy, no rigidity, no contractions    EXT:   LEFT STUMP DRESSING C/D/I    VASCULAR:  pulses equal and symmetric in the upper and lower extremities    NEURO:  AAOX3, no focal neurological deficits, follows all commands, able to move extremities spontaneously    SKIN:  no ulcers, lesions or rashes    LABS/IMAGING:        MEDICATIONS  (STANDING):  ALPRAZolam 0.25 milliGRAM(s) Oral at bedtime  ascorbic acid 500 milliGRAM(s) Oral daily  clopidogrel Tablet 75 milliGRAM(s) Oral daily  cyanocobalamin 1000 MICROGram(s) Oral daily  docusate sodium 200 milliGRAM(s) Oral at bedtime  enoxaparin Injectable 40 milliGRAM(s) SubCutaneous daily  ferrous    sulfate 325 milliGRAM(s) Oral daily  folic acid 1 milliGRAM(s) Oral daily  ibuprofen  Tablet 400 milliGRAM(s) Oral two times a day  lactobacillus acidophilus 1 Tablet(s) Oral daily  levETIRAcetam 500 milliGRAM(s) Oral two times a day  melatonin 3 milliGRAM(s) Oral at bedtime  metoprolol tartrate 25 milliGRAM(s) Oral two times a day  multivitamin 1 Tablet(s) Oral daily  polyethylene glycol 3350 17 Gram(s) Oral daily  sertraline 50 milliGRAM(s) Oral daily  simvastatin 10 milliGRAM(s) Oral at bedtime    MEDICATIONS  (PRN):  morphine  - Injectable 5 milliGRAM(s) SubCutaneous every 3 hours PRN Severe Pain  oxyCODONE    IR 5 milliGRAM(s) Oral three times a day with meals PRN Moderate Pain (4 - 6)
afebrile; urine output acceptable  comfortable; feels better    dressing clean and dry    A/P  stable  discontinue fluids  PT evaluation  MEDICATIONS  (STANDING):  ALPRAZolam 0.25 milliGRAM(s) Oral at bedtime  ascorbic acid 500 milliGRAM(s) Oral daily  clopidogrel Tablet 75 milliGRAM(s) Oral daily  cyanocobalamin 1000 MICROGram(s) Oral daily  docusate sodium 200 milliGRAM(s) Oral at bedtime  enoxaparin Injectable 40 milliGRAM(s) SubCutaneous daily  ferrous    sulfate 325 milliGRAM(s) Oral daily  folic acid 1 milliGRAM(s) Oral daily  ibuprofen  Tablet 400 milliGRAM(s) Oral two times a day  lactobacillus acidophilus 1 Tablet(s) Oral daily  levETIRAcetam 500 milliGRAM(s) Oral two times a day  melatonin 3 milliGRAM(s) Oral at bedtime  metoprolol tartrate 25 milliGRAM(s) Oral two times a day  multivitamin 1 Tablet(s) Oral daily  polyethylene glycol 3350 17 Gram(s) Oral daily  sertraline 50 milliGRAM(s) Oral daily  simvastatin 10 milliGRAM(s) Oral at bedtime  sodium chloride 0.9%. 1000 milliLiter(s) (70 mL/Hr) IV Continuous <Continuous>    MEDICATIONS  (PRN):  morphine  - Injectable 5 milliGRAM(s) SubCutaneous every 3 hours PRN Severe Pain  oxyCODONE    IR 5 milliGRAM(s) Oral three times a day with meals PRN Moderate Pain (4 - 6)      Allergies    No Known Allergies    Intolerances        Flatus: [ ] YES [ ] NO             Bowel Movement: [ ] YES [ ] NO  Pain (0-10):            Pain Control Adequate: [ ] YES [ ] NO  Nausea: [ ] YES [ ] NO            Vomiting: [ ] YES [ ] NO  Diarrhea: [ ] YES [ ] NO         Constipation: [ ] YES [ ] NO     Chest Pain: [ ] YES [ ] NO    SOB:  [ ] YES [ ] NO    Vital Signs Last 24 Hrs  T(C): 36.9 (25 May 2018 05:20), Max: 37.2 (24 May 2018 09:25)  T(F): 98.4 (25 May 2018 05:20), Max: 98.9 (24 May 2018 09:25)  HR: 90 (25 May 2018 05:20) (77 - 96)  BP: 124/55 (25 May 2018 05:20) (108/79 - 143/62)  BP(mean): --  RR: 18 (25 May 2018 05:20) (14 - 18)  SpO2: 97% (25 May 2018 05:20) (95% - 100%)    I&O's Summary    24 May 2018 07:01  -  25 May 2018 07:00  --------------------------------------------------------  IN: 1720 mL / OUT: 1800 mL / NET: -80 mL        Physical Exam:  General: NAD, resting comfortably  Pulmonary: normal resp effort, CTA-B  Cardiovascular: NSR  Abdominal: soft, NT/ND  Extremities: WWP, normal strength  Neuro: A/O x 3, CNs II-XII grossly intact, normal motor/sensation, no focal deficits  Pulses:   Right:                                                                          Left:  FEM [ ]2+ [ ]1+ [ ]doppler                                             FEM [ ]2+ [ ]1+ [ ]doppler    POP [ ]2+ [ ]1+ [ ]doppler                                             POP [ ]2+ [ ]1+ [ ]doppler    DP [ ]2+ [ ]1+ [ ]doppler                                                DP [ ]2+ [ ]1+ [ ]doppler  PT[ ]2+ [ ]1+ [ ]doppler                                                  PT [ ]2+ [ ]1+ [ ]doppler    LABS:                CAPILLARY BLOOD GLUCOSE          RADIOLOGY & ADDITIONAL TESTS:

## 2018-05-29 NOTE — PROGRESS NOTE ADULT - PROVIDER SPECIALTY LIST ADULT
Internal Medicine
Vascular Surgery
Vascular Surgery
Internal Medicine
Internal Medicine

## 2018-05-29 NOTE — DISCHARGE NOTE ADULT - MEDICATION SUMMARY - MEDICATIONS TO TAKE
I will START or STAY ON the medications listed below when I get home from the hospital:    oxyCODONE 5 mg oral tablet  -- 1 tab(s) by mouth , As Needed  given 30 minutes prior to wound care  -- Indication: For OA (osteoarthritis)    ibuprofen 400 mg oral tablet  -- 1 tab(s) by mouth 2 times a day  -- Indication: For OA (osteoarthritis)    levETIRAcetam 500 mg oral tablet  -- 1 tab(s) by mouth 2 times a day  -- Indication: For Hemiparesis    sertraline 50 mg oral tablet  -- 1 tab(s) by mouth once a day  -- Indication: For OA (osteoarthritis)    simvastatin 10 mg oral tablet  -- 1 tab(s) by mouth once a day (at bedtime)  -- Indication: For Hemiparesis    clopidogrel 75 mg oral tablet  -- 1 tab(s) by mouth once a day x 5 days   5/12 to 5/16/2018  -- Indication: For Hemiparesis    ALPRAZolam 0.25 mg oral tablet  -- 1 tab(s) by mouth once a day (at bedtime)  -- Indication: For Pressure ulcer    Metoprolol Tartrate 25 mg oral tablet  -- 1 tab(s) by mouth 2 times a day  -- Indication: For Hemiparesis    ferrous sulfate 324 mg (65 mg elemental iron) oral tablet  -- 1 tab(s) by mouth once a day  -- Indication: For Hemiparesis    Colace 100 mg oral capsule  -- 2 cap(s) by mouth once a day (at bedtime)  -- Indication: For Pressure ulcer    MiraLax oral powder for reconstitution  -- 17 gram(s) by mouth once a day  -- Indication: For Pressure ulcer    Melatonin 5 mg oral tablet  -- 1 tab(s) by mouth once a day (at bedtime)  -- Indication: For Pressure ulcer    Bacid (LAC) oral tablet  -- 1 tab(s) by mouth once a day  -- Indication: For Pressure ulcer    Multiple Vitamins oral tablet  -- 1 tab(s) by mouth once a day  -- Indication: For Pressure ulcer    Vitamin C 500 mg oral tablet  -- 1 tab(s) by mouth once a day  -- Indication: For Pressure ulcer    Vitamin B12 1000 mcg oral tablet  -- 1 tab(s) by mouth once a day  -- Indication: For Pressure ulcer    folic acid 1 mg oral tablet  -- 1 tab(s) by mouth once a day  -- Indication: For Pressure ulcer

## 2018-06-01 DIAGNOSIS — I69.354 HEMIPLEGIA AND HEMIPARESIS FOLLOWING CEREBRAL INFARCTION AFFECTING LEFT NON-DOMINANT SIDE: ICD-10-CM

## 2018-06-01 DIAGNOSIS — Z87.891 PERSONAL HISTORY OF NICOTINE DEPENDENCE: ICD-10-CM

## 2018-06-01 DIAGNOSIS — E03.9 HYPOTHYROIDISM, UNSPECIFIED: ICD-10-CM

## 2018-06-01 DIAGNOSIS — E78.5 HYPERLIPIDEMIA, UNSPECIFIED: ICD-10-CM

## 2018-06-01 DIAGNOSIS — L89.629 PRESSURE ULCER OF LEFT HEEL, UNSPECIFIED STAGE: ICD-10-CM

## 2018-06-01 DIAGNOSIS — I96 GANGRENE, NOT ELSEWHERE CLASSIFIED: ICD-10-CM

## 2018-06-01 DIAGNOSIS — G40.909 EPILEPSY, UNSPECIFIED, NOT INTRACTABLE, WITHOUT STATUS EPILEPTICUS: ICD-10-CM

## 2018-06-01 DIAGNOSIS — M19.90 UNSPECIFIED OSTEOARTHRITIS, UNSPECIFIED SITE: ICD-10-CM

## 2018-06-01 DIAGNOSIS — E55.9 VITAMIN D DEFICIENCY, UNSPECIFIED: ICD-10-CM

## 2018-06-01 DIAGNOSIS — F41.9 ANXIETY DISORDER, UNSPECIFIED: ICD-10-CM

## 2018-06-01 DIAGNOSIS — I10 ESSENTIAL (PRIMARY) HYPERTENSION: ICD-10-CM

## 2018-06-01 DIAGNOSIS — D62 ACUTE POSTHEMORRHAGIC ANEMIA: ICD-10-CM

## 2018-06-01 LAB — SURGICAL PATHOLOGY FINAL REPORT - CH: SIGNIFICANT CHANGE UP

## 2021-06-04 PROBLEM — G81.90 HEMIPLEGIA, UNSPECIFIED AFFECTING UNSPECIFIED SIDE: Chronic | Status: ACTIVE | Noted: 2018-05-24

## 2021-06-04 PROBLEM — M19.90 UNSPECIFIED OSTEOARTHRITIS, UNSPECIFIED SITE: Chronic | Status: ACTIVE | Noted: 2018-05-24

## 2021-06-04 PROBLEM — I82.409 ACUTE EMBOLISM AND THROMBOSIS OF UNSPECIFIED DEEP VEINS OF UNSPECIFIED LOWER EXTREMITY: Chronic | Status: ACTIVE | Noted: 2018-05-24

## 2021-06-04 PROBLEM — E55.9 VITAMIN D DEFICIENCY, UNSPECIFIED: Chronic | Status: ACTIVE | Noted: 2018-05-24

## 2021-06-04 PROBLEM — I65.29 OCCLUSION AND STENOSIS OF UNSPECIFIED CAROTID ARTERY: Chronic | Status: ACTIVE | Noted: 2018-05-24

## 2021-06-04 PROBLEM — L89.90 PRESSURE ULCER OF UNSPECIFIED SITE, UNSPECIFIED STAGE: Chronic | Status: ACTIVE | Noted: 2018-05-24

## 2021-06-21 NOTE — PATIENT PROFILE ADULT. - ABILITY TO HEAR (WITH HEARING AID OR HEARING APPLIANCE IF NORMALLY USED):
[address] [place] [address] [address] [place] [address]  Phone: [phone]  Fax: [phone]    Ady Jean        June 21, 2021     Patient: Moe Rowland   YOB: 1960   Date of Visit: 6/21/2021       To Whom It May Concern: It is my medical opinion that Francis Lee may return to light duty immediately with the following restrictions: lifting/carrying not to exceed 5 lbs. , pushing/pulling not to exceed 5 lbs., Duration of restrictions (days):  90, no bending/twisting. Wear brace. If you have any questions or concerns, please don't hesitate to call.     Sincerely,        Carlos Ruano PA-C Adequate: hears normal conversation without difficulty

## 2021-07-06 PROBLEM — Z00.00 ENCOUNTER FOR PREVENTIVE HEALTH EXAMINATION: Status: ACTIVE | Noted: 2021-07-06

## 2021-07-07 ENCOUNTER — APPOINTMENT (OUTPATIENT)
Dept: DERMATOLOGY | Facility: CLINIC | Age: 76
End: 2021-07-07

## 2023-07-21 NOTE — ED PROVIDER NOTE - PMH
English
CVA (cerebral vascular accident)    Epilepsy    Hemiplegia    HLD (hyperlipidemia)    HTN (hypertension)    Hypothyroid

## 2024-03-21 NOTE — PATIENT PROFILE ADULT. - FUNCTIONAL SCREEN CURRENT LEVEL: TRANSFERRING, MLM
Detail Level: Simple
Continue Regimen: Hydrocortisone 2.5% ointment to active rash up to BID as needed
Render In Strict Bullet Format?: No
(4) completely dependent

## 2025-01-04 NOTE — DISCHARGE NOTE ADULT - NS MD DC FALL RISK RISK
Standing/Walking/Toileting/Moving from bed to stretcher
For information on Fall & Injury Prevention, visit [email or web address]